# Patient Record
Sex: MALE | Race: WHITE | NOT HISPANIC OR LATINO
[De-identification: names, ages, dates, MRNs, and addresses within clinical notes are randomized per-mention and may not be internally consistent; named-entity substitution may affect disease eponyms.]

---

## 2017-01-25 ENCOUNTER — APPOINTMENT (OUTPATIENT)
Dept: RADIOLOGY | Facility: HOSPITAL | Age: 62
End: 2017-01-25

## 2017-01-25 ENCOUNTER — OUTPATIENT (OUTPATIENT)
Dept: OUTPATIENT SERVICES | Facility: HOSPITAL | Age: 62
LOS: 1 days | End: 2017-01-25

## 2017-01-25 DIAGNOSIS — R13.10 DYSPHAGIA, UNSPECIFIED: ICD-10-CM

## 2017-02-06 ENCOUNTER — FORM ENCOUNTER (OUTPATIENT)
Age: 62
End: 2017-02-06

## 2017-02-07 ENCOUNTER — OUTPATIENT (OUTPATIENT)
Dept: OUTPATIENT SERVICES | Facility: HOSPITAL | Age: 62
LOS: 1 days | End: 2017-02-07
Payer: COMMERCIAL

## 2017-02-07 ENCOUNTER — OUTPATIENT (OUTPATIENT)
Dept: OUTPATIENT SERVICES | Facility: HOSPITAL | Age: 62
LOS: 1 days | Discharge: HOME | End: 2017-02-07

## 2017-02-07 ENCOUNTER — APPOINTMENT (OUTPATIENT)
Dept: RADIOLOGY | Facility: HOSPITAL | Age: 62
End: 2017-02-07

## 2017-02-07 DIAGNOSIS — R13.10 DYSPHAGIA, UNSPECIFIED: ICD-10-CM

## 2017-02-07 PROCEDURE — 74230 X-RAY XM SWLNG FUNCJ C+: CPT | Mod: 26

## 2017-02-08 DIAGNOSIS — R13.12 DYSPHAGIA, OROPHARYNGEAL PHASE: ICD-10-CM

## 2017-02-15 ENCOUNTER — APPOINTMENT (OUTPATIENT)
Dept: ORTHOPEDIC SURGERY | Facility: CLINIC | Age: 62
End: 2017-02-15

## 2017-02-15 VITALS
HEART RATE: 80 BPM | SYSTOLIC BLOOD PRESSURE: 130 MMHG | HEIGHT: 69 IN | DIASTOLIC BLOOD PRESSURE: 85 MMHG | BODY MASS INDEX: 29.92 KG/M2 | WEIGHT: 202 LBS

## 2017-02-15 DIAGNOSIS — M47.812 SPONDYLOSIS W/OUT MYELOPATHY OR RADICULOPATHY, CERVICAL REGION: ICD-10-CM

## 2017-02-21 ENCOUNTER — TRANSCRIPTION ENCOUNTER (OUTPATIENT)
Age: 62
End: 2017-02-21

## 2017-02-22 PROBLEM — Z87.891 FORMER SMOKER: Status: ACTIVE | Noted: 2017-02-22

## 2018-07-23 PROBLEM — M47.812 CERVICAL SPONDYLOSIS: Status: ACTIVE | Noted: 2017-02-15

## 2019-10-29 ENCOUNTER — OUTPATIENT (OUTPATIENT)
Dept: OUTPATIENT SERVICES | Facility: HOSPITAL | Age: 64
LOS: 1 days | End: 2019-10-29
Payer: MEDICARE

## 2019-10-29 ENCOUNTER — APPOINTMENT (OUTPATIENT)
Dept: RADIOLOGY | Facility: CLINIC | Age: 64
End: 2019-10-29
Payer: MEDICARE

## 2019-10-29 DIAGNOSIS — Z00.8 ENCOUNTER FOR OTHER GENERAL EXAMINATION: ICD-10-CM

## 2019-10-29 PROCEDURE — 74018 RADEX ABDOMEN 1 VIEW: CPT | Mod: 26

## 2019-10-29 PROCEDURE — 74018 RADEX ABDOMEN 1 VIEW: CPT

## 2019-11-20 ENCOUNTER — OUTPATIENT (OUTPATIENT)
Dept: OUTPATIENT SERVICES | Facility: HOSPITAL | Age: 64
LOS: 1 days | End: 2019-11-20
Payer: MEDICARE

## 2019-11-20 ENCOUNTER — APPOINTMENT (OUTPATIENT)
Dept: RADIOLOGY | Facility: CLINIC | Age: 64
End: 2019-11-20
Payer: MEDICARE

## 2019-11-20 DIAGNOSIS — Z00.8 ENCOUNTER FOR OTHER GENERAL EXAMINATION: ICD-10-CM

## 2019-11-20 PROCEDURE — 74018 RADEX ABDOMEN 1 VIEW: CPT | Mod: 26

## 2019-11-20 PROCEDURE — 74018 RADEX ABDOMEN 1 VIEW: CPT

## 2019-11-27 ENCOUNTER — APPOINTMENT (OUTPATIENT)
Dept: UROLOGY | Facility: CLINIC | Age: 64
End: 2019-11-27
Payer: MEDICARE

## 2019-11-27 ENCOUNTER — APPOINTMENT (OUTPATIENT)
Dept: UROLOGY | Facility: CLINIC | Age: 64
End: 2019-11-27

## 2019-11-27 PROCEDURE — 99203 OFFICE O/P NEW LOW 30 MIN: CPT

## 2019-11-27 RX ORDER — METOPROLOL SUCCINATE 100 MG
100 TABLET, EXTENDED RELEASE 24 HR ORAL
Refills: 0 | Status: ACTIVE | COMMUNITY

## 2019-11-27 RX ORDER — LEVOCETIRIZINE DIHYDROCHLORIDE 5 MG/1
5 TABLET, FILM COATED ORAL
Refills: 0 | Status: ACTIVE | COMMUNITY

## 2019-12-02 ENCOUNTER — TRANSCRIPTION ENCOUNTER (OUTPATIENT)
Age: 64
End: 2019-12-02

## 2019-12-02 NOTE — REVIEW OF SYSTEMS
[Abdominal Pain] : abdominal pain [Seen by urologist before (Name)  ___] : Preciously seen by a urologist: [unfilled] [Told you have blood in urine on a urine test] : told blood was present in a urine test [History of kidney stones] : history of kidney stones [Negative] : Heme/Lymph [FreeTextEntry3] : dribbling of urine

## 2019-12-02 NOTE — PHYSICAL EXAM
[General Appearance - Well Developed] : well developed [Well Groomed] : well groomed [Normal Appearance] : normal appearance [General Appearance - Well Nourished] : well nourished [General Appearance - In No Acute Distress] : no acute distress [Respiration, Rhythm And Depth] : normal respiratory rhythm and effort [] : no respiratory distress [Exaggerated Use Of Accessory Muscles For Inspiration] : no accessory muscle use [Normal Station and Gait] : the gait and station were normal for the patient's age [No Focal Deficits] : no focal deficits [Affect] : the affect was normal [Oriented To Time, Place, And Person] : oriented to person, place, and time [Mood] : the mood was normal [Not Anxious] : not anxious

## 2019-12-02 NOTE — HISTORY OF PRESENT ILLNESS
[FreeTextEntry1] : Pt is 65 yo male with h/o renal calculi- underwent recent ESWL for renal stone after passage of distal ureteral stone. Feeling well at this time.\par \par Previously seen approx 5 years ago, h/o stone, nephrocalcinosis on CT urogram for hematuria eval.  No f/u after cystoscopy 10/2014.\par \par Reports doing well at this time.

## 2019-12-02 NOTE — ASSESSMENT
[FreeTextEntry1] : CT scan ([facility]) reviewed\par \par KUB 11/2019- pre and post op---> appeared to have resolved distal ureter stone spontaneously, had right renal ESWL with good passage of fragments, f/u KUB clear post ESWL\par \par Diet modification reviewed at length- increasing fluids (primarily water), citrus is good, and decreasing/moderating salt, animal flesh protein, oxalate containing foods, and moderation of calcium intake (1000 mg/day is USRDA).\par \par I reviewed with the patient the risks of metabolic stone disease given their underlying risk parameters (all of which include large stones, multiple stones, bilateral stones, family history, and young age), and the indications for 24 hour urine metabolic assessment.\par \par We also discussed benefits of regular exercise and weight loss as independent risk reducers for stones.\par \par 1. Diet modification as discussed\par 2. 24 hour urine collection x 2 in the next few weeks\par 3. RTC with renal US in 8 to 10 weeks to ensure new baseline, resolution of hydronephrosis\par

## 2019-12-03 ENCOUNTER — APPOINTMENT (OUTPATIENT)
Dept: UROLOGY | Facility: CLINIC | Age: 64
End: 2019-12-03

## 2020-02-20 LAB
ANION GAP SERPL CALC-SCNC: 13 MMOL/L
BUN SERPL-MCNC: 15 MG/DL
CALCIUM SERPL-MCNC: 9.3 MG/DL
CALCIUM SERPL-MCNC: 9.3 MG/DL
CHLORIDE SERPL-SCNC: 104 MMOL/L
CO2 SERPL-SCNC: 27 MMOL/L
CREAT SERPL-MCNC: 0.77 MG/DL
GLUCOSE SERPL-MCNC: 102 MG/DL
PARATHYROID HORMONE INTACT: 39 PG/ML
POTASSIUM SERPL-SCNC: 4.6 MMOL/L
SODIUM SERPL-SCNC: 144 MMOL/L

## 2020-02-21 LAB
CALCIUM ?TM UR-MCNC: 22.1 MG/DL
CALCIUM/CREAT UR: 0.1 RATIO
CREAT SPEC-SCNC: 166 MG/DL

## 2020-03-18 ENCOUNTER — APPOINTMENT (OUTPATIENT)
Dept: UROLOGY | Facility: CLINIC | Age: 65
End: 2020-03-18

## 2020-03-19 ENCOUNTER — TRANSCRIPTION ENCOUNTER (OUTPATIENT)
Age: 65
End: 2020-03-19

## 2020-03-23 ENCOUNTER — TRANSCRIPTION ENCOUNTER (OUTPATIENT)
Age: 65
End: 2020-03-23

## 2020-03-26 ENCOUNTER — TRANSCRIPTION ENCOUNTER (OUTPATIENT)
Age: 65
End: 2020-03-26

## 2020-05-06 ENCOUNTER — APPOINTMENT (OUTPATIENT)
Dept: UROLOGY | Facility: CLINIC | Age: 65
End: 2020-05-06

## 2020-05-16 ENCOUNTER — TRANSCRIPTION ENCOUNTER (OUTPATIENT)
Age: 65
End: 2020-05-16

## 2020-07-22 ENCOUNTER — APPOINTMENT (OUTPATIENT)
Dept: UROLOGY | Facility: CLINIC | Age: 65
End: 2020-07-22
Payer: MEDICARE

## 2020-07-22 ENCOUNTER — OUTPATIENT (OUTPATIENT)
Dept: OUTPATIENT SERVICES | Facility: HOSPITAL | Age: 65
LOS: 1 days | End: 2020-07-22
Payer: MEDICARE

## 2020-07-22 VITALS — TEMPERATURE: 97.9 F

## 2020-07-22 DIAGNOSIS — N20.0 CALCULUS OF KIDNEY: ICD-10-CM

## 2020-07-22 DIAGNOSIS — R35.0 FREQUENCY OF MICTURITION: ICD-10-CM

## 2020-07-22 PROCEDURE — 76775 US EXAM ABDO BACK WALL LIM: CPT | Mod: 26

## 2020-07-22 PROCEDURE — 99214 OFFICE O/P EST MOD 30 MIN: CPT | Mod: 25

## 2020-07-22 PROCEDURE — 76775 US EXAM ABDO BACK WALL LIM: CPT

## 2020-07-22 NOTE — ASSESSMENT
[FreeTextEntry1] : Renal US: no stone, no hydro, possible small round mass effect right kidney, central; ~ 2 cm. Mildly hyperechoic, flow around but not seen within.\par \par We spoke about diet, hypercalciuria- he prefers to avoid HCTZ at this time, work on diet.\par \par 1. CT scan w/wo IV contrast to better assess for right kidney\par 2. repeat 24 hour urine collection in next few weeks to assess for hypercalciuria, overall risks on current diet mod\par 3. will review by phone, possible telehealth if we need\par 4. f/u pending results of CT and 24 hour urine

## 2020-07-22 NOTE — PHYSICAL EXAM
[Normal Appearance] : normal appearance [General Appearance - Well Nourished] : well nourished [General Appearance - Well Developed] : well developed [Well Groomed] : well groomed [General Appearance - In No Acute Distress] : no acute distress [Abdomen Soft] : soft [Abdomen Tenderness] : non-tender [Costovertebral Angle Tenderness] : no ~M costovertebral angle tenderness [Urethral Meatus] : meatus normal [Penis Abnormality] : normal circumcised penis [Scrotum] : the scrotum was normal [Urinary Bladder Findings] : the bladder was normal on palpation [Epididymis] : the epididymides were normal [Testes Mass (___cm)] : there were no testicular masses [No Prostate Nodules] : no prostate nodules [Prostate Size ___ (0-4)] : prostate size [unfilled] (scale: 0-4) [FreeTextEntry1] : firm subcapsular calcification right mid- no definite nodule [] : no respiratory distress [Edema] : no peripheral edema [Exaggerated Use Of Accessory Muscles For Inspiration] : no accessory muscle use [Respiration, Rhythm And Depth] : normal respiratory rhythm and effort [Mood] : the mood was normal [Affect] : the affect was normal [Oriented To Time, Place, And Person] : oriented to person, place, and time [Not Anxious] : not anxious [Normal Station and Gait] : the gait and station were normal for the patient's age [No Focal Deficits] : no focal deficits [No Palpable Adenopathy] : no palpable adenopathy

## 2020-07-22 NOTE — HISTORY OF PRESENT ILLNESS
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  Feeling well; h/o BPH, but states low psa.  No new issues to report, f/u delayed by COVID.  H/o ESWL ~10/2020 outside.   h/o renal calculi- underwent recent ESWL for renal stone after passage of distal ureteral stone. Feeling well at this time.\par \par Previously seen approx 7 years ago, h/o stone, nephrocalcinosis on CT urogram for hematuria eval. \par \par PTH, BMP okay, but fasting urine ca:cr 0.13 c/w renal leak.  Has not continued HCTZ 25 mg 'side effects'-- felt impaired sexual function.\par \par PSA's remain low, per pt. 0.82 - 10/2019 [None] : no symptoms

## 2020-07-23 DIAGNOSIS — R82.994 HYPERCALCIURIA: ICD-10-CM

## 2020-07-23 DIAGNOSIS — N20.0 CALCULUS OF KIDNEY: ICD-10-CM

## 2020-07-23 DIAGNOSIS — N28.89 OTHER SPECIFIED DISORDERS OF KIDNEY AND URETER: ICD-10-CM

## 2020-07-23 DIAGNOSIS — N40.1 BENIGN PROSTATIC HYPERPLASIA WITH LOWER URINARY TRACT SYMPTOMS: ICD-10-CM

## 2020-09-10 ENCOUNTER — OUTPATIENT (OUTPATIENT)
Dept: OUTPATIENT SERVICES | Facility: HOSPITAL | Age: 65
LOS: 1 days | End: 2020-09-10
Payer: MEDICARE

## 2020-09-10 ENCOUNTER — APPOINTMENT (OUTPATIENT)
Dept: CT IMAGING | Facility: IMAGING CENTER | Age: 65
End: 2020-09-10
Payer: MEDICARE

## 2020-09-10 DIAGNOSIS — N28.89 OTHER SPECIFIED DISORDERS OF KIDNEY AND URETER: ICD-10-CM

## 2020-09-10 PROCEDURE — 74170 CT ABD WO CNTRST FLWD CNTRST: CPT

## 2020-09-10 PROCEDURE — 74170 CT ABD WO CNTRST FLWD CNTRST: CPT | Mod: 26

## 2020-09-10 PROCEDURE — 82565 ASSAY OF CREATININE: CPT

## 2020-11-10 RX ORDER — METOPROLOL SUCCINATE 100 MG/1
100 TABLET, EXTENDED RELEASE ORAL
Refills: 0 | Status: ACTIVE | COMMUNITY
Start: 2020-11-10

## 2020-11-10 RX ORDER — PHENAZOPYRIDINE HYDROCHLORIDE 200 MG/1
200 TABLET ORAL 3 TIMES DAILY
Qty: 15 | Refills: 0 | Status: ACTIVE | COMMUNITY
Start: 2020-11-10 | End: 1900-01-01

## 2020-11-11 LAB
APPEARANCE: CLEAR
BACTERIA UR CULT: NORMAL
BACTERIA: NEGATIVE
BILIRUBIN URINE: NEGATIVE
BLOOD URINE: NEGATIVE
COLOR: YELLOW
GLUCOSE QUALITATIVE U: NEGATIVE
HYALINE CASTS: 1 /LPF
KETONES URINE: NEGATIVE
LEUKOCYTE ESTERASE URINE: NEGATIVE
MICROSCOPIC-UA: NORMAL
NITRITE URINE: NEGATIVE
PH URINE: 6
PROTEIN URINE: NORMAL
RED BLOOD CELLS URINE: 5 /HPF
SPECIFIC GRAVITY URINE: 1.03
SQUAMOUS EPITHELIAL CELLS: 1 /HPF
UROBILINOGEN URINE: NORMAL
WHITE BLOOD CELLS URINE: 1 /HPF

## 2020-11-12 ENCOUNTER — NON-APPOINTMENT (OUTPATIENT)
Age: 65
End: 2020-11-12

## 2021-01-11 ENCOUNTER — NON-APPOINTMENT (OUTPATIENT)
Age: 66
End: 2021-01-11

## 2021-04-24 ENCOUNTER — RX RENEWAL (OUTPATIENT)
Age: 66
End: 2021-04-24

## 2021-09-09 ENCOUNTER — APPOINTMENT (OUTPATIENT)
Dept: UROLOGY | Facility: CLINIC | Age: 66
End: 2021-09-09
Payer: MEDICARE

## 2021-09-09 DIAGNOSIS — N28.89 OTHER SPECIFIED DISORDERS OF KIDNEY AND URETER: ICD-10-CM

## 2021-09-09 PROCEDURE — 99214 OFFICE O/P EST MOD 30 MIN: CPT | Mod: 25

## 2021-09-09 PROCEDURE — 76775 US EXAM ABDO BACK WALL LIM: CPT

## 2021-09-09 NOTE — PHYSICAL EXAM
[General Appearance - Well Developed] : well developed [General Appearance - Well Nourished] : well nourished [Normal Appearance] : normal appearance [Well Groomed] : well groomed [General Appearance - In No Acute Distress] : no acute distress [Abdomen Soft] : soft [Abdomen Tenderness] : non-tender [Costovertebral Angle Tenderness] : no ~M costovertebral angle tenderness [Urethral Meatus] : meatus normal [Penis Abnormality] : normal circumcised penis [Urinary Bladder Findings] : the bladder was normal on palpation [Scrotum] : the scrotum was normal [Epididymis] : the epididymides were normal [Testes Mass (___cm)] : there were no testicular masses [No Prostate Nodules] : no prostate nodules [Prostate Size ___ (0-4)] : prostate size [unfilled] (scale: 0-4) [FreeTextEntry1] : firm subcapsular calcification right mid- no definite nodule [Edema] : no peripheral edema [] : no respiratory distress [Respiration, Rhythm And Depth] : normal respiratory rhythm and effort [Exaggerated Use Of Accessory Muscles For Inspiration] : no accessory muscle use [Oriented To Time, Place, And Person] : oriented to person, place, and time [Affect] : the affect was normal [Mood] : the mood was normal [Not Anxious] : not anxious [Normal Station and Gait] : the gait and station were normal for the patient's age [No Focal Deficits] : no focal deficits

## 2021-09-09 NOTE — ASSESSMENT
[FreeTextEntry1] : Renal US: unchanged right lower pole renal cyst, and right mid subcapsular fluid. Likely left lower pole stone(s) with 5 mm showing twinkle and shadowing. No hydro. No solid renal mass (and compared with CT 2020).\par \par 1. check psa, BMP  today\par 2. check 24 hour urine on current meds\par 3. RTC 6 months with renal US\par 4. 24 hour urine x 1 for review- will review by phone\par 5. cont diet mod and hctz 12.5 mg per day

## 2021-09-09 NOTE — HISTORY OF PRESENT ILLNESS
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  Feeling well; h/o BPH, but states low psa.  No new issues to report, f/u delayed by COVID.  H/o ESWL ~10/2020 outside.   h/o renal calculi- underwent recent ESWL for renal stone after passage of distal ureteral stone. Feeling well at this time.\par \par PTH, BMP okay, but fasting urine ca:cr 0.13 c/w renal leak.  Has not continued HCTZ 25 mg 'side effects'-- felt impaired sexual function.\par \par PSA's remain low, per pt.\par \par Continues on HCTZ though at 12.5 mg.\par \par Delayed f/u since last year.\par \par Had CT scan 9/2020 in f/u to possible mass vs subcapsular hematoma- > no mass.\par \par For f/u renal US and exam today [None] : no symptoms

## 2021-09-10 LAB
ANION GAP SERPL CALC-SCNC: 12 MMOL/L
BUN SERPL-MCNC: 19 MG/DL
CALCIUM SERPL-MCNC: 9.2 MG/DL
CHLORIDE SERPL-SCNC: 105 MMOL/L
CO2 SERPL-SCNC: 26 MMOL/L
CREAT SERPL-MCNC: 0.78 MG/DL
GLUCOSE SERPL-MCNC: 118 MG/DL
POTASSIUM SERPL-SCNC: 4.4 MMOL/L
PSA SERPL-MCNC: 0.91 NG/ML
SODIUM SERPL-SCNC: 144 MMOL/L

## 2021-09-13 ENCOUNTER — NON-APPOINTMENT (OUTPATIENT)
Age: 66
End: 2021-09-13

## 2021-09-15 ENCOUNTER — APPOINTMENT (OUTPATIENT)
Dept: UROLOGY | Facility: CLINIC | Age: 66
End: 2021-09-15

## 2021-10-05 ENCOUNTER — APPOINTMENT (OUTPATIENT)
Dept: UROLOGY | Facility: CLINIC | Age: 66
End: 2021-10-05
Payer: MEDICARE

## 2021-10-05 PROCEDURE — 99442: CPT | Mod: 95

## 2021-10-07 NOTE — ASSESSMENT
[FreeTextEntry1] : D/w pt at length, all options\par we discussed use of hctz, hypercalciuria, overall stone risks and prevention\par Will plan RTC 6 months with renal US and f/u\par cont all meds including finasteride

## 2021-10-07 NOTE — HISTORY OF PRESENT ILLNESS
[Other Location: e.g. School (Enter Location, City,State)___] : at [unfilled], at the time of the visit. [Other Location: e.g. Home (Enter Location, City,State)___] : at [unfilled] [Verbal consent obtained from patient] : the patient, [unfilled] [FreeTextEntry1] : The patient-doctor relationship has been established in a face to face fashion via real time video/audio HIPAA compliant communication using telemedicine software. The patient's identity has been confirmed. The patient was previously emailed a copy of the telemedicine consent. They have had a chance to review and has now given verbal consent and has requested care to be assessed and treated via telemedicine. They understand there may be limitations in this process, and that they may need further followup care in the office and/or hospital settings.\par \par Verbal consent given on Oct  5 2021  9:00AM\par \par DUANE PASCALE is a 66 year M who presents for tone, renal leak Hypercalciuria on labs (Calcium 278, 352). Feeling well; h/o BPH, but states low psa. No new issues to report, f/u delayed by COVID. H/o ESWL ~10/2020 outside. h/o renal calculi- underwent recent ESWL for renal stone after passage of distal ureteral stone. Feeling well at this time.\par \par PTH, BMP okay, but fasting urine ca:cr 0.13 c/w renal leak. Has not continued HCTZ 25 mg 'side effects'-- felt impaired sexual function.\par \par PSA's remain low- 0.91 --> 9/10/21\par \par Continues on HCTZ though at 12.5 mg for BP control on 100 mg toprol\par \par Previous imaging: CT scan 9/2020 in f/u to possible mass vs subcapsular hematoma- > no mass. On 99/21 Renal US: unchanged right lower pole renal cyst, and right mid subcapsular fluid. Likely left lower pole stone(s) with 5 mm showing twinkle and shadowing. No hydro. No solid renal mass (and compared with CT 2020).\par \par Pt with questions about 24 hour urine to do, surgery.\par \par 10/05/2021 \par \par The patient denies fevers, chills, nausea and/or vomiting, and no unexplained weight loss.\par \par All pertinent parts of the patient PFSH (past medical, family, and social histories), laboratory, radiological studies and available physician notes were reviewed prior to starting the face-to-face portion of the telemedicine visit. Questionnaire results, where appropriate, were discussed with the patient.\par

## 2021-10-20 ENCOUNTER — NON-APPOINTMENT (OUTPATIENT)
Age: 66
End: 2021-10-20

## 2021-10-26 ENCOUNTER — NON-APPOINTMENT (OUTPATIENT)
Age: 66
End: 2021-10-26

## 2021-10-26 LAB
APPEARANCE: CLEAR
BACTERIA: NEGATIVE
BILIRUBIN URINE: NEGATIVE
BLOOD URINE: NORMAL
CALCIUM OXALATE CRYSTALS: ABNORMAL
COLOR: YELLOW
GLUCOSE QUALITATIVE U: NEGATIVE
HYALINE CASTS: 4 /LPF
KETONES URINE: NEGATIVE
LEUKOCYTE ESTERASE URINE: NEGATIVE
MICROSCOPIC-UA: NORMAL
NITRITE URINE: NEGATIVE
PH URINE: 5.5
PROTEIN URINE: NORMAL
RED BLOOD CELLS URINE: 2 /HPF
SPECIFIC GRAVITY URINE: 1.03
SQUAMOUS EPITHELIAL CELLS: 0 /HPF
URINE COMMENTS: NORMAL
UROBILINOGEN URINE: NORMAL
WHITE BLOOD CELLS URINE: 1 /HPF

## 2021-10-27 ENCOUNTER — APPOINTMENT (OUTPATIENT)
Dept: UROLOGY | Facility: CLINIC | Age: 66
End: 2021-10-27

## 2021-10-27 LAB — BACTERIA UR CULT: NORMAL

## 2021-10-28 ENCOUNTER — NON-APPOINTMENT (OUTPATIENT)
Age: 66
End: 2021-10-28

## 2022-03-03 ENCOUNTER — APPOINTMENT (OUTPATIENT)
Dept: UROLOGY | Facility: CLINIC | Age: 67
End: 2022-03-03
Payer: MEDICARE

## 2022-03-03 PROCEDURE — 76775 US EXAM ABDO BACK WALL LIM: CPT

## 2022-03-03 PROCEDURE — 99213 OFFICE O/P EST LOW 20 MIN: CPT | Mod: 25

## 2022-03-03 NOTE — PHYSICAL EXAM
[General Appearance - Well Developed] : well developed [General Appearance - Well Nourished] : well nourished [Normal Appearance] : normal appearance [Well Groomed] : well groomed [General Appearance - In No Acute Distress] : no acute distress [Abdomen Soft] : soft [Abdomen Tenderness] : non-tender [Costovertebral Angle Tenderness] : no ~M costovertebral angle tenderness [FreeTextEntry1] : deferred [Edema] : no peripheral edema [] : no respiratory distress [Respiration, Rhythm And Depth] : normal respiratory rhythm and effort [Exaggerated Use Of Accessory Muscles For Inspiration] : no accessory muscle use [Oriented To Time, Place, And Person] : oriented to person, place, and time [Affect] : the affect was normal [Mood] : the mood was normal [Not Anxious] : not anxious [Normal Station and Gait] : the gait and station were normal for the patient's age [No Focal Deficits] : no focal deficits

## 2022-03-03 NOTE — ASSESSMENT
[FreeTextEntry1] : Renal US reviewed:  unchanged small, simple, right lower pole renal cyst, and right mid subcapsular fluid previously eval with CT- no mass at that position, no enlargement, continued peripheral vascularity. Likely left lower pole stone(s) with 5-6 mm showing twinkle and shadowing. No hydro. No solid renal mass (and compared with CT 2020). \par \par Cont HCTZ, diet modification\par \par will rtc 6 months with full exam and renal US

## 2022-03-03 NOTE — HISTORY OF PRESENT ILLNESS
[FreeTextEntry1] : DUANE PASCALE is a 66 year M who presents for tone, renal leak Hypercalciuria on labs (Calcium 278, 352). Feeling well; h/o BPH, but states low psa. No new issues to report, f/u delayed by COVID. H/o ESWL ~10/2020 outside. h/o renal calculi- underwent recent ESWL for renal stone after passage of distal ureteral stone. Feeling well at this time.\par \par PTH, BMP okay, but fasting urine ca:cr 0.13 c/w renal leak. Has not continued HCTZ 25 mg 'side effects'-- felt impaired sexual function.\par \par PSA's remain low- 0.91 --> 9/10/21\par \par Continues on HCTZ though at 12.5 mg for BP control and stone risks... on 100 mg toprol\par \par Previous imaging: CT scan 9/2020 in f/u to possible mass vs subcapsular hematoma- > no mass. On 99/21 Renal US: unchanged right lower pole renal cyst, and right mid subcapsular fluid. Likely left lower pole stone(s) with 5 mm showing twinkle and shadowing. No hydro. No solid renal mass (and compared with CT 2020).\par \par No new health issues to report- for f/u renal US today\par \par Has stopped finasteride as well. [None] : no symptoms

## 2022-06-05 ENCOUNTER — NON-APPOINTMENT (OUTPATIENT)
Age: 67
End: 2022-06-05

## 2022-07-01 ENCOUNTER — NON-APPOINTMENT (OUTPATIENT)
Age: 67
End: 2022-07-01

## 2022-10-26 ENCOUNTER — APPOINTMENT (OUTPATIENT)
Dept: UROLOGY | Facility: CLINIC | Age: 67
End: 2022-10-26

## 2022-10-26 ENCOUNTER — OUTPATIENT (OUTPATIENT)
Dept: OUTPATIENT SERVICES | Facility: HOSPITAL | Age: 67
LOS: 1 days | End: 2022-10-26
Payer: MEDICARE

## 2022-10-26 DIAGNOSIS — R35.0 FREQUENCY OF MICTURITION: ICD-10-CM

## 2022-10-26 PROCEDURE — 76775 US EXAM ABDO BACK WALL LIM: CPT | Mod: 26

## 2022-10-26 PROCEDURE — 99214 OFFICE O/P EST MOD 30 MIN: CPT | Mod: 25

## 2022-10-26 PROCEDURE — 76775 US EXAM ABDO BACK WALL LIM: CPT

## 2022-10-26 RX ORDER — HYDROCHLOROTHIAZIDE 25 MG/1
25 TABLET ORAL DAILY
Qty: 90 | Refills: 3 | Status: COMPLETED | COMMUNITY
Start: 2020-02-21 | End: 2022-10-26

## 2022-10-26 RX ORDER — HYDROCHLOROTHIAZIDE 12.5 MG/1
12.5 TABLET ORAL
Qty: 90 | Refills: 0 | Status: ACTIVE | COMMUNITY
Start: 2022-06-22

## 2022-10-26 NOTE — ASSESSMENT
[FreeTextEntry1] : Renal US reviewed:  unchanged small, simple, right lower pole renal cyst ~ 2 cm unchanged, and right mid subcapsular fluid previously eval with CT- no mass at that position, no enlargement, continued peripheral vascularity. Prior left lower pole echogenic focus/stone ~ 5-6 mm showing twinkle and shadowing not seen today. No hydro. No solid renal mass (and compared with CT 2020). \par \par Cont HCTZ, diet modification\par \par will rtc 12 months with full exam and renal US\par check psa today- will review by phone (off finasteride)

## 2022-10-26 NOTE — PHYSICAL EXAM
[General Appearance - Well Developed] : well developed [General Appearance - Well Nourished] : well nourished [Normal Appearance] : normal appearance [Well Groomed] : well groomed [General Appearance - In No Acute Distress] : no acute distress [Abdomen Soft] : soft [Abdomen Tenderness] : non-tender [Costovertebral Angle Tenderness] : no ~M costovertebral angle tenderness [Urethral Meatus] : meatus normal [Urinary Bladder Findings] : the bladder was normal on palpation [Scrotum] : the scrotum was normal [No Prostate Nodules] : no prostate nodules [Prostate Size ___ (0-4)] : prostate size [unfilled] (scale: 0-4) [Edema] : no peripheral edema [] : no respiratory distress [Respiration, Rhythm And Depth] : normal respiratory rhythm and effort [Exaggerated Use Of Accessory Muscles For Inspiration] : no accessory muscle use [Oriented To Time, Place, And Person] : oriented to person, place, and time [Affect] : the affect was normal [Mood] : the mood was normal [Not Anxious] : not anxious [Normal Station and Gait] : the gait and station were normal for the patient's age [No Focal Deficits] : no focal deficits

## 2022-10-26 NOTE — HISTORY OF PRESENT ILLNESS
[FreeTextEntry1] : DUANE PASCALE is a 67 year M who presents for tone, renal leak Hypercalciuria on labs (Calcium 278, 352). Feeling well; h/o BPH, but states low psa. No new issues to report, f/u delayed by COVID. H/o ESWL ~10/2020 outside. h/o renal calculi- underwent recent ESWL for renal stone after passage of distal ureteral stone. Feeling well at this time.\par \par PTH, BMP okay, but fasting urine ca:cr 0.13 c/w renal leak. Has not continued HCTZ 25 mg 'side effects'-- felt impaired sexual function.\par \par Continues on HCTZ though at 12.5 mg for BP control and stone risks... on 100 mg toprol\par \par Previous imaging: CT scan 9/2020 in f/u to possible mass vs subcapsular hematoma- > no mass. On 99/21 Renal US: unchanged right lower pole renal cyst, and right mid subcapsular fluid. Likely left lower pole stone(s) with 5 mm showing twinkle and shadowing. No hydro. No solid renal mass (and compared with CT 2020).\par \par No new health issues to report- for f/u renal US today\par \par Has stopped finasteride as well.\par \par PSA's\par \par 0.91 --> 9/10/21 [None] : no symptoms

## 2022-10-28 DIAGNOSIS — N40.1 BENIGN PROSTATIC HYPERPLASIA WITH LOWER URINARY TRACT SYMPTOMS: ICD-10-CM

## 2022-10-28 DIAGNOSIS — R82.994 HYPERCALCIURIA: ICD-10-CM

## 2022-10-28 DIAGNOSIS — N20.0 CALCULUS OF KIDNEY: ICD-10-CM

## 2022-10-30 LAB — PSA SERPL-MCNC: 0.78 NG/ML

## 2022-10-31 ENCOUNTER — NON-APPOINTMENT (OUTPATIENT)
Age: 67
End: 2022-10-31

## 2023-11-01 ENCOUNTER — APPOINTMENT (OUTPATIENT)
Dept: UROLOGY | Facility: CLINIC | Age: 68
End: 2023-11-01
Payer: MEDICARE

## 2023-11-01 ENCOUNTER — OUTPATIENT (OUTPATIENT)
Dept: OUTPATIENT SERVICES | Facility: HOSPITAL | Age: 68
LOS: 1 days | End: 2023-11-01
Payer: MEDICARE

## 2023-11-01 DIAGNOSIS — N40.1 BENIGN PROSTATIC HYPERPLASIA WITH LOWER URINARY TRACT SYMPMS: ICD-10-CM

## 2023-11-01 DIAGNOSIS — R82.994 HYPERCALCIURIA: ICD-10-CM

## 2023-11-01 DIAGNOSIS — R35.0 FREQUENCY OF MICTURITION: ICD-10-CM

## 2023-11-01 DIAGNOSIS — N28.1 CYST OF KIDNEY, ACQUIRED: ICD-10-CM

## 2023-11-01 DIAGNOSIS — N20.0 CALCULUS OF KIDNEY: ICD-10-CM

## 2023-11-01 DIAGNOSIS — N13.8 BENIGN PROSTATIC HYPERPLASIA WITH LOWER URINARY TRACT SYMPMS: ICD-10-CM

## 2023-11-01 PROCEDURE — 76770 US EXAM ABDO BACK WALL COMP: CPT | Mod: 26

## 2023-11-01 PROCEDURE — 76770 US EXAM ABDO BACK WALL COMP: CPT

## 2023-11-01 PROCEDURE — 99213 OFFICE O/P EST LOW 20 MIN: CPT | Mod: 25

## 2023-11-02 DIAGNOSIS — N40.1 BENIGN PROSTATIC HYPERPLASIA WITH LOWER URINARY TRACT SYMPTOMS: ICD-10-CM

## 2023-11-02 DIAGNOSIS — R82.994 HYPERCALCIURIA: ICD-10-CM

## 2023-11-02 DIAGNOSIS — N20.0 CALCULUS OF KIDNEY: ICD-10-CM

## 2023-11-02 DIAGNOSIS — N28.1 CYST OF KIDNEY, ACQUIRED: ICD-10-CM

## 2024-03-08 ENCOUNTER — TRANSCRIPTION ENCOUNTER (OUTPATIENT)
Age: 69
End: 2024-03-08

## 2024-11-06 ENCOUNTER — OUTPATIENT (OUTPATIENT)
Dept: OUTPATIENT SERVICES | Facility: HOSPITAL | Age: 69
LOS: 1 days | End: 2024-11-06
Payer: MEDICARE

## 2024-11-06 ENCOUNTER — APPOINTMENT (OUTPATIENT)
Dept: UROLOGY | Facility: CLINIC | Age: 69
End: 2024-11-06
Payer: MEDICARE

## 2024-11-06 DIAGNOSIS — R82.994 HYPERCALCIURIA: ICD-10-CM

## 2024-11-06 DIAGNOSIS — N20.0 CALCULUS OF KIDNEY: ICD-10-CM

## 2024-11-06 DIAGNOSIS — Z12.5 ENCOUNTER FOR SCREENING FOR MALIGNANT NEOPLASM OF PROSTATE: ICD-10-CM

## 2024-11-06 DIAGNOSIS — R35.0 FREQUENCY OF MICTURITION: ICD-10-CM

## 2024-11-06 DIAGNOSIS — N40.1 BENIGN PROSTATIC HYPERPLASIA WITH LOWER URINARY TRACT SYMPMS: ICD-10-CM

## 2024-11-06 DIAGNOSIS — N28.1 CYST OF KIDNEY, ACQUIRED: ICD-10-CM

## 2024-11-06 DIAGNOSIS — N13.8 BENIGN PROSTATIC HYPERPLASIA WITH LOWER URINARY TRACT SYMPMS: ICD-10-CM

## 2024-11-06 PROCEDURE — 76770 US EXAM ABDO BACK WALL COMP: CPT | Mod: 26

## 2024-11-06 PROCEDURE — 99214 OFFICE O/P EST MOD 30 MIN: CPT | Mod: 25

## 2024-11-07 DIAGNOSIS — N40.1 BENIGN PROSTATIC HYPERPLASIA WITH LOWER URINARY TRACT SYMPTOMS: ICD-10-CM

## 2024-11-07 DIAGNOSIS — R82.994 HYPERCALCIURIA: ICD-10-CM

## 2024-11-07 DIAGNOSIS — N28.1 CYST OF KIDNEY, ACQUIRED: ICD-10-CM

## 2024-11-07 DIAGNOSIS — N20.0 CALCULUS OF KIDNEY: ICD-10-CM

## 2024-11-20 PROCEDURE — 76770 US EXAM ABDO BACK WALL COMP: CPT
